# Patient Record
Sex: FEMALE | URBAN - METROPOLITAN AREA
[De-identification: names, ages, dates, MRNs, and addresses within clinical notes are randomized per-mention and may not be internally consistent; named-entity substitution may affect disease eponyms.]

---

## 2023-10-26 ENCOUNTER — APPOINTMENT (RX ONLY)
Dept: URBAN - METROPOLITAN AREA CLINIC 335 | Facility: CLINIC | Age: 33
Setting detail: DERMATOLOGY
End: 2023-10-26

## 2023-10-26 DIAGNOSIS — L71.8 OTHER ROSACEA: ICD-10-CM | Status: INADEQUATELY CONTROLLED

## 2023-10-26 DIAGNOSIS — L21.8 OTHER SEBORRHEIC DERMATITIS: ICD-10-CM | Status: INADEQUATELY CONTROLLED

## 2023-10-26 PROCEDURE — 99204 OFFICE O/P NEW MOD 45 MIN: CPT

## 2023-10-26 PROCEDURE — ? COUNSELING

## 2023-10-26 PROCEDURE — ? PRESCRIPTION

## 2023-10-26 RX ORDER — IVERMECTIN/METRONIDAZOLE/NIACI 1 %-1 %-4%
GEL (GRAM) TOPICAL
Qty: 30 | Refills: 0 | Status: ERX | COMMUNITY
Start: 2023-10-26

## 2023-10-26 RX ORDER — KETOCONAZOLE 20 MG/G
CREAM TOPICAL BID
Qty: 60 | Refills: 1 | Status: ERX | COMMUNITY
Start: 2023-10-26

## 2023-10-26 RX ORDER — DOXYCYCLINE HYCLATE 100 MG/1
CAPSULE, GELATIN COATED ORAL
Qty: 60 | Refills: 1 | Status: ERX | COMMUNITY
Start: 2023-10-26

## 2023-10-26 RX ORDER — HYDROCORTISONE 25 MG/G
CREAM TOPICAL
Qty: 30 | Refills: 1 | Status: ERX | COMMUNITY
Start: 2023-10-26

## 2023-10-26 RX ADMIN — DOXYCYCLINE HYCLATE: 100 CAPSULE, GELATIN COATED ORAL at 00:00

## 2023-10-26 RX ADMIN — KETOCONAZOLE: 20 CREAM TOPICAL at 00:00

## 2023-10-26 RX ADMIN — Medication: at 00:00

## 2023-10-26 RX ADMIN — HYDROCORTISONE: 25 CREAM TOPICAL at 00:00

## 2023-10-26 ASSESSMENT — LOCATION ZONE DERM: LOCATION ZONE: FACE

## 2023-10-26 ASSESSMENT — LOCATION SIMPLE DESCRIPTION DERM
LOCATION SIMPLE: LEFT CHEEK
LOCATION SIMPLE: RIGHT CHEEK

## 2023-10-26 ASSESSMENT — LOCATION DETAILED DESCRIPTION DERM
LOCATION DETAILED: LEFT INFERIOR CENTRAL MALAR CHEEK
LOCATION DETAILED: RIGHT CENTRAL MALAR CHEEK
LOCATION DETAILED: RIGHT INFERIOR CENTRAL MALAR CHEEK

## 2023-12-05 ENCOUNTER — APPOINTMENT (RX ONLY)
Dept: URBAN - METROPOLITAN AREA CLINIC 335 | Facility: CLINIC | Age: 33
Setting detail: DERMATOLOGY
End: 2023-12-05

## 2023-12-05 ENCOUNTER — RX ONLY (OUTPATIENT)
Age: 33
Setting detail: RX ONLY
End: 2023-12-05

## 2023-12-05 DIAGNOSIS — L21.8 OTHER SEBORRHEIC DERMATITIS: ICD-10-CM | Status: IMPROVED

## 2023-12-05 DIAGNOSIS — L71.8 OTHER ROSACEA: ICD-10-CM | Status: IMPROVED

## 2023-12-05 PROCEDURE — ? PRESCRIPTION MEDICATION MANAGEMENT

## 2023-12-05 PROCEDURE — 99214 OFFICE O/P EST MOD 30 MIN: CPT

## 2023-12-05 PROCEDURE — ? COUNSELING

## 2023-12-05 RX ORDER — IVERMECTIN/METRONIDAZOLE/NIACI 1 %-1 %-4%
GEL (GRAM) TOPICAL
Qty: 30 | Refills: 0 | Status: CANCELLED

## 2023-12-05 ASSESSMENT — LOCATION SIMPLE DESCRIPTION DERM
LOCATION SIMPLE: RIGHT CHEEK
LOCATION SIMPLE: LEFT CHEEK

## 2023-12-05 ASSESSMENT — LOCATION DETAILED DESCRIPTION DERM
LOCATION DETAILED: RIGHT CENTRAL MALAR CHEEK
LOCATION DETAILED: RIGHT INFERIOR CENTRAL MALAR CHEEK
LOCATION DETAILED: LEFT INFERIOR CENTRAL MALAR CHEEK

## 2023-12-05 ASSESSMENT — LOCATION ZONE DERM: LOCATION ZONE: FACE

## 2023-12-05 NOTE — PROCEDURE: PRESCRIPTION MEDICATION MANAGEMENT
Render In Strict Bullet Format?: No
Continue Regimen: ketoconazole 2 % topical cream BID\\nQuantity: 60.0 g  Days Supply: 30\\nSig: Apply once daily three times a week to affected area of the face\\n\\nHydrocortisone- once a day three times a week
Detail Level: Zone
Continue Regimen: hydrocortisone 2.5 % topical cream \\nQuantity: 30.0 g\\nSig: Apply pea size amount to the face once a day on Monday, Wednesday and Friday\\n\\ndoxycycline hyclate 100 mg capsule \\nQuantity: 60.0 Capsule  Days Supply: 30\\nSig: Take one tablet by mouth once a day with food (after breakfast and dinner)\\n\\nAveidaoxia 1 %-1 %-4 % topical gel \\nQuantity: 30.0 g  Days Supply: 30\\nSig: Apply to the face twice daily must wear sunscreen

## 2024-02-06 ENCOUNTER — APPOINTMENT (RX ONLY)
Dept: URBAN - METROPOLITAN AREA CLINIC 335 | Facility: CLINIC | Age: 34
Setting detail: DERMATOLOGY
End: 2024-02-06

## 2024-02-06 DIAGNOSIS — L71.8 OTHER ROSACEA: ICD-10-CM | Status: IMPROVED

## 2024-02-06 DIAGNOSIS — I78.8 OTHER DISEASES OF CAPILLARIES: ICD-10-CM | Status: INADEQUATELY CONTROLLED

## 2024-02-06 DIAGNOSIS — L21.8 OTHER SEBORRHEIC DERMATITIS: ICD-10-CM | Status: IMPROVED

## 2024-02-06 PROCEDURE — ? PRESCRIPTION MEDICATION MANAGEMENT

## 2024-02-06 PROCEDURE — ? COUNSELING

## 2024-02-06 PROCEDURE — 99214 OFFICE O/P EST MOD 30 MIN: CPT

## 2024-02-06 PROCEDURE — ? RECOMMENDATIONS

## 2024-02-06 PROCEDURE — ? PRESCRIPTION

## 2024-02-06 RX ORDER — TRETIONIN 0.5 MG/G
CREAM TOPICAL
Qty: 45 | Refills: 3 | Status: ERX | COMMUNITY
Start: 2024-02-06

## 2024-02-06 RX ORDER — DOXYCYCLINE HYCLATE 50 MG/1
TABLET, FILM COATED ORAL
Qty: 30 | Refills: 1 | Status: ERX | COMMUNITY
Start: 2024-02-06

## 2024-02-06 RX ADMIN — TRETIONIN: 0.5 CREAM TOPICAL at 00:00

## 2024-02-06 RX ADMIN — DOXYCYCLINE HYCLATE: 50 TABLET, FILM COATED ORAL at 00:00

## 2024-02-06 ASSESSMENT — LOCATION ZONE DERM
LOCATION ZONE: NOSE
LOCATION ZONE: FACE

## 2024-02-06 ASSESSMENT — LOCATION SIMPLE DESCRIPTION DERM
LOCATION SIMPLE: LEFT NOSE
LOCATION SIMPLE: RIGHT CHEEK
LOCATION SIMPLE: LEFT CHEEK
LOCATION SIMPLE: RIGHT NOSE

## 2024-02-06 ASSESSMENT — LOCATION DETAILED DESCRIPTION DERM
LOCATION DETAILED: LEFT INFERIOR CENTRAL MALAR CHEEK
LOCATION DETAILED: RIGHT CENTRAL MALAR CHEEK
LOCATION DETAILED: LEFT NASAL ALA
LOCATION DETAILED: RIGHT INFERIOR CENTRAL MALAR CHEEK
LOCATION DETAILED: RIGHT NASAL ALA

## 2024-02-06 NOTE — PROCEDURE: RECOMMENDATIONS
Render Risk Assessment In Note?: no
Detail Level: Zone
Recommendation Preamble: The following recommendations were made during the visit: Schedule with [facility] for laser treatments

## 2024-04-04 ENCOUNTER — APPOINTMENT (RX ONLY)
Dept: URBAN - METROPOLITAN AREA CLINIC 335 | Facility: CLINIC | Age: 34
Setting detail: DERMATOLOGY
End: 2024-04-04

## 2024-04-04 ENCOUNTER — RX ONLY (OUTPATIENT)
Age: 34
Setting detail: RX ONLY
End: 2024-04-04

## 2024-04-04 DIAGNOSIS — L21.8 OTHER SEBORRHEIC DERMATITIS: ICD-10-CM | Status: IMPROVED

## 2024-04-04 DIAGNOSIS — L82.0 INFLAMED SEBORRHEIC KERATOSIS: ICD-10-CM | Status: INADEQUATELY CONTROLLED

## 2024-04-04 DIAGNOSIS — L71.8 OTHER ROSACEA: ICD-10-CM | Status: IMPROVED

## 2024-04-04 DIAGNOSIS — Z41.9 ENCOUNTER FOR PROCEDURE FOR PURPOSES OTHER THAN REMEDYING HEALTH STATE, UNSPECIFIED: ICD-10-CM

## 2024-04-04 PROCEDURE — ? COUNSELING

## 2024-04-04 PROCEDURE — ? PRODUCT LINE (REVISION)

## 2024-04-04 PROCEDURE — ? COSMETIC CONSULTATION: HYDRAFACIAL

## 2024-04-04 PROCEDURE — 99214 OFFICE O/P EST MOD 30 MIN: CPT | Mod: 25

## 2024-04-04 PROCEDURE — ? PRODUCT LINE (OFFICE PRODUCTS)

## 2024-04-04 PROCEDURE — ? LIQUID NITROGEN

## 2024-04-04 PROCEDURE — 17110 DESTRUCTION B9 LES UP TO 14: CPT

## 2024-04-04 PROCEDURE — ? PRESCRIPTION MEDICATION MANAGEMENT

## 2024-04-04 RX ORDER — IVERMECTIN/METRONIDAZOLE/NIACI 1 %-1 %-4%
GEL (GRAM) TOPICAL
Qty: 30 | Refills: 0 | Status: ERX | COMMUNITY
Start: 2024-04-04

## 2024-04-04 ASSESSMENT — LOCATION SIMPLE DESCRIPTION DERM
LOCATION SIMPLE: RIGHT UPPER ARM
LOCATION SIMPLE: LEFT CHEEK
LOCATION SIMPLE: RIGHT CHEEK
LOCATION SIMPLE: LEFT CHEEK

## 2024-04-04 ASSESSMENT — LOCATION DETAILED DESCRIPTION DERM
LOCATION DETAILED: RIGHT CENTRAL MALAR CHEEK
LOCATION DETAILED: RIGHT INFERIOR CENTRAL MALAR CHEEK
LOCATION DETAILED: LEFT CENTRAL MALAR CHEEK
LOCATION DETAILED: LEFT INFERIOR CENTRAL MALAR CHEEK
LOCATION DETAILED: RIGHT ANTERIOR DISTAL UPPER ARM

## 2024-04-04 ASSESSMENT — LOCATION ZONE DERM
LOCATION ZONE: FACE
LOCATION ZONE: FACE
LOCATION ZONE: ARM

## 2024-04-04 NOTE — PROCEDURE: PRESCRIPTION MEDICATION MANAGEMENT
Render In Strict Bullet Format?: No
Continue Regimen: ketoconazole 2 % topical cream BID\\nQuantity: 60.0 g  Days Supply: 30\\nSig: Apply once a day two times a week to affected area of the face\\n\\nHydrocortisone- once a day three times a week\\n\\nTretinoin: apply to the face at night twice a week mixed with HA Serum
Detail Level: Zone
Continue Regimen: hydrocortisone 2.5 % topical cream \\nQuantity: 30.0 g\\nSig: Apply pea size amount to the face once a day on Monday, Wednesday and Friday\\n\\nAveidaoxia 1 %-1 %-4 % topical gel \\nQuantity: 30.0 g  Days Supply: 30\\nSig: Apply to the face twice daily must wear sunscreen
Plan: HA Serum
Discontinue Regimen: doxycycline hyclate 50mg capsule \\nSig: Take one tablet by mouth once a day with food (after breakfast and dinner)

## 2024-04-04 NOTE — PROCEDURE: PRODUCT LINE (REVISION)
Name Of Product 16: YouthFull Lip Replenisher
Product 4 Application Directions: Use morning and evening after cleansing, but before moisturizing. Dispense one pump into palm of hand and apply evenly to the face, avoiding the eye area. Be sure to follow with SPF
Name Of Product 2: Brightening face wash
Product 13 Price (In Dollars - Numeric Only, No Special Characters Or $): 127.00
Name Of Product 8: Hydrating Serum
Product 26 Price (In Dollars - Numeric Only, No Special Characters Or $): 0.00
Product 50 Units: 0
Risk Of Complication Category: No MDM
Product 10 Application Directions: Dispense one or more pumps and gently apply onto décolletage. Using upward strokes, work your way up to the neck and jawline. Use twice daily.
Product 16 Price (In Dollars - Numeric Only, No Special Characters Or $): 38.00
Name Of Product 5: DEJ Eye Cream
Product 2 Price (In Dollars - Numeric Only, No Special Characters Or $): 40.00
Product 8 Price (In Dollars - Numeric Only, No Special Characters Or $): 94.00
Allow Plan To Count Towards E/M Coding: Yes
Product 5 Application Directions: Gently dispense up to one pump and dot around eye area. Massage product onto skin using cooling metal applicator. Tap in excess product with finger. Avoid direct eye contact. Use twice daily
Name Of Product 11: Pumpkin Enzyme Mask
Product 13 Application Directions: Use only at night. After cleansing, dispense one to two pumps on back of hand. Apply to face two to three times per week, avoiding the eye area. Increase usage as tolerated
Product 8 Units: 1
Name Of Product 6: DEJ Boosting Serum
Product 5 Price (In Dollars - Numeric Only, No Special Characters Or $): 114.00
Product 16 Application Directions: Glide onto lips in a massaging motion. Use three times daily for optimal results or as needed. Use alone or layered over your favorite lip color.
Product 11 Price (In Dollars - Numeric Only, No Special Characters Or $): 52.00
Product 8 Application Directions: Apply to clean skin. Dispense two pumps into palm of hand and gently apply to face. Avoid eye area. Use twice daily. Can be used alone or as a moisture booster under creams or lotions.
Product 2 Application Directions: Wet face with tepid water. Dispense a dime-sized amount into palm of hand. Lather in hands. Using fingertips, gently massage onto face using circular motions. Avoid eye area. Rinse thoroughly and pat skin dry. Can be used once or twice daily as tolerated.
Name Of Product 14: Revox Line Relaxer
Name Of Product 3: C+ Brightening Eye Complex
Name Of Product 9: Intellishade TruPhysical
Product 6 Price (In Dollars - Numeric Only, No Special Characters Or $): 225.00
Product 14 Price (In Dollars - Numeric Only, No Special Characters Or $): 151.00
Name Of Product 17: Gentle foaming cleanser
Product 11 Application Directions: Using fingertips, apply a generous amount onto dry skin, avoiding eye area. Lightly scrub in the mask using circular motions and moving outward to stimulate gentle exfoliation. Leave on for 15-20 minutes. Remove with warm water and if needed, a washcloth. Pat skin dry. Use 1-3 times per week.
Product 3 Price (In Dollars - Numeric Only, No Special Characters Or $): 118
Product 9 Price (In Dollars - Numeric Only, No Special Characters Or $): 80.00
Product 17 Price (In Dollars - Numeric Only, No Special Characters Or $): 44
Product 6 Application Directions: Used twice daily on the full face, including the total eye area.
Product 14 Application Directions: Apply to clean skin prior to moisturizer. Dispense desired amount and massage serum onto any fine lines and wrinkles. Avoid direct eye contact. Use twice daily. NOTE: A thin layer of product is all that is needed
Name Of Product 12: Retinol Complete 0.5
Name Of Product 7: Gentle Cleansing Lotion
Product 17 Application Directions: Use one pump up to twice daily, work into skin and rinse thoroughly
Name Of Product 15: Soothing Facial Rinse
Name Of Product 1: Jenniferirm
Product 12 Price (In Dollars - Numeric Only, No Special Characters Or $): 104.00
Product 3 Application Directions: Dispense a small amount onto cooling metal applicator and massage outward in a circular motion onto under-eye area only. Tap in excess product with finger. Avoid direct eye contact. Use twice daily.
Product 9 Application Directions: Apply evenly to face as the last step in your morning skincare routine. Wear alone or under makeup. Apply liberally 15 minutes prior to sun exposure (See Drug Fact(s) Panels on cartons.). Can be used with Vitamin C Lotion 15% or 30% for added antioxidant benefits
Name Of Product 4: C+ Correcting Complex 30%
Name Of Product 10: Nectifirm
Product 1 Price (In Dollars - Numeric Only, No Special Characters Or $): 157.50
Product 7 Price (In Dollars - Numeric Only, No Special Characters Or $): 40
Product 4 Price (In Dollars - Numeric Only, No Special Characters Or $): 170
Detail Level: Zone
Product 12 Application Directions: Use only at night. After cleansing, dispense one to two pumps on back of hand. Apply to face two to three times per week, avoiding the eye area. Increase usage as tolerated.
Product 10 Price (In Dollars - Numeric Only, No Special Characters Or $): 98.00
Product 7 Application Directions: Wet face with tepid water. Dispense a quarter-sized amount into palm of hand. Using fingertips, gently massage onto face using circular motions. Rinse thoroughly and pat skin dry. Use twice daily, morning and evening
Product 15 Application Directions: After cleansing, saturate a cotton pad with toner. Gently swipe across face. Avoid eye area. Use twice daily
Name Of Product 13: Retinol Complete 1.0
Product 1 Application Directions: Using a circular motion, massage into skin until fully absorbed. Apply twice daily. To see optimal results, it is recommended to exfoliate twice a week using a loofah or physical exfoliator.
Assigning Risk Information: Per AMA, level of risk is based upon consequences of the problem(s) addressed at the encounter when appropriately treated. Risk also includes medical decision making related to the need to initiate or forego further testing, treatment and/or hospitalization. Over the counter medication are assigned a risk level of low. Prescription medication management is assigned a risk level of moderate.

## 2024-04-04 NOTE — PROCEDURE: PRODUCT LINE (OFFICE PRODUCTS)
Product 52 Units: 0
Product 38 Price (In Dollars - Numeric Only, No Special Characters Or $): 0.00
Product 1 Application Directions: Using a pea sized amount work into hands and wash face, rinse thoroughly.  Use twice daily
Product 12 Application Directions: Use daily after washing
Product 4 Price (In Dollars - Numeric Only, No Special Characters Or $): 28.00
Name Of Product 15: Proscriptix FX 10/2 Acne Care Pads
Product 10 Price (In Dollars - Numeric Only, No Special Characters Or $): 45.00
Product 7 Application Directions: Apply and spread evenly over affected area 15 minutes before sun exposure. Reapply often.
Assigning Risk Information: Per AMA, level of risk is based upon consequences of the problem(s) addressed at the encounter when appropriately treated. Risk also includes medical decision making related to the need to initiate or forego further testing, treatment and/or hospitalization. Over the counter medication are assigned a risk level of low. Prescription medication management is assigned a risk level of moderate.
Name Of Product 7: Heal and protect Scar Gel SPF 45
Name Of Product 2: ultra gentle cleanser
Name Of Product 13: Ultra hydrating treatment
Product 15 Price (In Dollars - Numeric Only, No Special Characters Or $): 43
Risk Of Complication Category: No MDM
Name Of Product 8: Healing & Exfoliating Kit
Product 7 Price (In Dollars - Numeric Only, No Special Characters Or $): 66.15
Product 10 Application Directions: Apply a nickel-size amount 2 times a day directly onto the scalp in the hair-loss area. Use fingers to spread evenly.
Product 4 Application Directions: Work into ends of hair, allow to sit, rinse with cool water
Product 13 Price (In Dollars - Numeric Only, No Special Characters Or $): 102.90
Name Of Product 5: Pro.Pil FX hair & scalp supplement
Allow Plan To Count Towards E/M Coding: Yes
Product 2 Price (In Dollars - Numeric Only, No Special Characters Or $): 33.60
Product 8 Price (In Dollars - Numeric Only, No Special Characters Or $): 61.00
Name Of Product 11: Hydrating tinted sunscreen
Product 5 Price (In Dollars - Numeric Only, No Special Characters Or $): 46.00
Product 15 Application Directions: Use once daily or as tolerated for sensitive skin
Product 13 Application Directions: Use a small amount daily after washing but before any other moisturizers or sunscreen
Product 11 Price (In Dollars - Numeric Only, No Special Characters Or $): 44.10
Product 2 Application Directions: Using a pea sized amount work into hands and wash face, rinse thoroughly. Use twice daily
Product 8 Application Directions: Use both products daily, Wash and moisturizer to exfoliate and treat KP
Name Of Product 16: Proscriptix FX 5/2 Acne cleanser
Name Of Product 9: Proscriptix Activ Balancing Cleanser
Product 16 Price (In Dollars - Numeric Only, No Special Characters Or $): 38
Name Of Product 3: Densifi FX Volumizing Shampoo
Product 5 Application Directions: Take one capsule 3 times daily with 8 oz of water. Includes 90 capsules for a full 30-day supply
Name Of Product 14: Tinted Physical sunscreen
Product 11 Application Directions: Apply liberally and spread evenly 15 minutes before sun exposure and as needed. Re-apply at least every 2 hours, after swimming, excessive perspiring, or any time after towel drying.
Name Of Product 6: Mineral Powder Sunscreen
Product 9 Price (In Dollars - Numeric Only, No Special Characters Or $): 12.00
Name Of Product 12: Lightweight  moisture cream
Product 14 Price (In Dollars - Numeric Only, No Special Characters Or $): 47.25
Product 6 Price (In Dollars - Numeric Only, No Special Characters Or $): 50.40
Product 16 Application Directions: Use a pea sized amount to lather in hands then work into skin, rinse thoroughly. Use once daily. A lightweight oil free cleanser formulated with 5% glycolic and 2% salisylic acid
Name Of Product 1: Vital Activ Antioxidant Cleanser
Product 1 Price (In Dollars - Numeric Only, No Special Characters Or $): 34.00
Product 9 Application Directions: Apply to wet skin and massage gently with fingertips. Rinse with lukewarm water. Pat skin to dry with soft, clean towel or cloth. Use twice daily
Product 3 Application Directions: Work into scalp, rinse thoroughly
Product 12 Price (In Dollars - Numeric Only, No Special Characters Or $): 80
Detail Level: Zone
Name Of Product 4: Densifi FX Volumizing conditioner
Product 12 Units: 1
Name Of Product 10: X.Cyte Fx Stimulating Hair Serum
Product 6 Application Directions: Re-apply throughout the day to maintain good coverage

## 2024-04-04 NOTE — PROCEDURE: LIQUID NITROGEN
Application Tool (Optional): Liquid Nitrogen Sprayer
Include Z78.9 (Other Specified Conditions Influencing Health Status) As An Associated Diagnosis?: Yes
Medical Necessity Clause: This procedure was medically necessary because the lesions that were treated were:
Number Of Freeze-Thaw Cycles: 2 freeze-thaw cycles
Render Post-Care Instructions In Note?: no
Detail Level: Simple
Duration Of Freeze Thaw-Cycle (Seconds): 3
Post-Care Instructions: I reviewed with the patient in detail post-care instructions. Patient is to wear sunprotection, and avoid picking at any of the treated lesions. Pt may apply Vaseline to crusted or scabbing areas.
Spray Paint Text: The liquid nitrogen was applied to the skin utilizing a spray paint frosting technique.
Consent: The patient's written consent was obtained including but not limited to risks of crusting, scabbing, blistering, scarring, darker or lighter pigmentary change, recurrence, incomplete removal and infection.
Medical Necessity Information: It is in your best interest to select a reason for this procedure from the list below. All of these items fulfill various CMS LCD requirements except the new and changing color options.

## 2024-04-06 ENCOUNTER — APPOINTMENT (RX ONLY)
Dept: URBAN - METROPOLITAN AREA CLINIC 335 | Facility: CLINIC | Age: 34
Setting detail: DERMATOLOGY
End: 2024-04-06

## 2024-04-06 DIAGNOSIS — Z41.9 ENCOUNTER FOR PROCEDURE FOR PURPOSES OTHER THAN REMEDYING HEALTH STATE, UNSPECIFIED: ICD-10-CM

## 2024-04-06 PROCEDURE — ? EXPRESS HYDRAFACIAL

## 2024-04-06 NOTE — PROCEDURE: EXPRESS HYDRAFACIAL
Number Of Passes: 1
Procedure: Exfoliation
Vacuum Pressure High Setting (Will Not Render If Set To 0): 15
Solution: GlySal 7.5%
Solution: Antiox-6
Tip: Hydropeel Tip, Teal
Vacuum Pressure Low Setting (Will Not Render If Set To 0): 0
Consent: Written consent obtained, risks reviewed including but not limited to crusting, scabbing, blistering, scarring, darker or lighter pigmentary change, bruising, and/or incomplete response.
Location: face
Post-Care Instructions: I reviewed with the patient in detail post-care instructions. Patient should stay away from the sun and wear sun protection until treated areas are fully healed.
Solution: Activ-4
Solution Override
Number Of Passes: 2
Procedure: Extraction
Tip: Hydropeel Tip, Clear
Procedure: Fusion
Solution: Beta-HD
Price (Use Numbers Only, No Special Characters Or $): 199
Procedure: Peel
Indication: prominent pores
Tip Override
Tip: Hydropeel Tip, Blue

## 2024-07-03 ENCOUNTER — RX ONLY (OUTPATIENT)
Age: 34
Setting detail: RX ONLY
End: 2024-07-03

## 2024-07-03 ENCOUNTER — APPOINTMENT (RX ONLY)
Dept: URBAN - METROPOLITAN AREA CLINIC 335 | Facility: CLINIC | Age: 34
Setting detail: DERMATOLOGY
End: 2024-07-03

## 2024-07-03 DIAGNOSIS — L71.8 OTHER ROSACEA: ICD-10-CM

## 2024-07-03 DIAGNOSIS — L21.8 OTHER SEBORRHEIC DERMATITIS: ICD-10-CM

## 2024-07-03 PROCEDURE — ? COUNSELING

## 2024-07-03 PROCEDURE — ? PRESCRIPTION

## 2024-07-03 PROCEDURE — 99214 OFFICE O/P EST MOD 30 MIN: CPT

## 2024-07-03 PROCEDURE — ? TREATMENT GOALS

## 2024-07-03 PROCEDURE — ? PRESCRIPTION MEDICATION MANAGEMENT

## 2024-07-03 PROCEDURE — ? RECOMMENDATIONS

## 2024-07-03 RX ORDER — DOXYCYCLINE HYCLATE 50 MG/1
CAPSULE, GELATIN COATED ORAL
Qty: 30 | Refills: 1 | Status: ERX | COMMUNITY
Start: 2024-07-03

## 2024-07-03 RX ORDER — HYDROCORTISONE 25 MG/G
CREAM TOPICAL
Qty: 30 | Refills: 1 | Status: ERX

## 2024-07-03 RX ORDER — TRETIONIN 0.5 MG/G
CREAM TOPICAL
Qty: 45 | Refills: 3 | Status: ERX

## 2024-07-03 RX ORDER — KETOCONAZOLE 20 MG/G
CREAM TOPICAL BID
Qty: 60 | Refills: 1 | Status: ERX

## 2024-07-03 RX ADMIN — DOXYCYCLINE HYCLATE: 50 CAPSULE, GELATIN COATED ORAL at 00:00

## 2024-07-03 ASSESSMENT — LOCATION DETAILED DESCRIPTION DERM
LOCATION DETAILED: RIGHT INFERIOR CENTRAL MALAR CHEEK
LOCATION DETAILED: LEFT INFERIOR CENTRAL MALAR CHEEK
LOCATION DETAILED: RIGHT CENTRAL MALAR CHEEK

## 2024-07-03 ASSESSMENT — LOCATION ZONE DERM: LOCATION ZONE: FACE

## 2024-07-03 ASSESSMENT — SEVERITY ASSESSMENT: HOW SEVERE IS THIS PATIENT'S CONDITION?: MODERATE

## 2024-07-03 ASSESSMENT — LOCATION SIMPLE DESCRIPTION DERM
LOCATION SIMPLE: RIGHT CHEEK
LOCATION SIMPLE: LEFT CHEEK

## 2024-07-03 ASSESSMENT — SEVERITY ASSESSMENT OVERALL AMONG ALL PATIENTS
IN YOUR EXPERIENCE, AMONG ALL PATIENTS YOU HAVE SEEN WITH THIS CONDITION, HOW SEVERE IS THIS PATIENT'S CONDITION?: MODERATE

## 2024-07-03 NOTE — PROCEDURE: PRESCRIPTION MEDICATION MANAGEMENT
Render In Strict Bullet Format?: No
Continue Regimen: ketoconazole 2 % topical cream BID\\nQuantity: 60.0 g  Days Supply: 30\\nSig: Apply once a day two times a week to affected area of the face\\n\\nHydrocortisone- once a day three times a week\\n\\nTretinoin: apply to the face at night twice a week mixed with HA Serum
Detail Level: Zone
Continue Regimen: hydrocortisone 2.5 % topical cream \\nQuantity: 30.0 g\\nSig: Apply pea size amount to the face once a day on Monday, Wednesday and Friday\\n\\nAveidaoxia 1 %-1 %-4 % topical gel \\nQuantity: 30.0 g  Days Supply: 30\\nSig: Apply to the face twice daily must wear sunscreen
Plan: HA Serum